# Patient Record
Sex: MALE | Race: OTHER | HISPANIC OR LATINO
[De-identification: names, ages, dates, MRNs, and addresses within clinical notes are randomized per-mention and may not be internally consistent; named-entity substitution may affect disease eponyms.]

---
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
## 2025-04-26 ENCOUNTER — EMERGENCY (EMERGENCY)
Facility: HOSPITAL | Age: 20
LOS: 0 days | Discharge: HOME | End: 2025-04-26
Attending: EMERGENCY MEDICINE

## 2025-04-26 VITALS
OXYGEN SATURATION: 98 % | HEART RATE: 79 BPM | WEIGHT: 158.73 LBS | DIASTOLIC BLOOD PRESSURE: 77 MMHG | RESPIRATION RATE: 17 BRPM | TEMPERATURE: 99 F | SYSTOLIC BLOOD PRESSURE: 126 MMHG

## 2025-04-26 PROCEDURE — 99282 EMERGENCY DEPT VISIT SF MDM: CPT

## 2025-04-26 PROCEDURE — 99283 EMERGENCY DEPT VISIT LOW MDM: CPT

## 2025-04-26 NOTE — ED PROVIDER NOTE - OBJECTIVE STATEMENT
20-year-old male with no significant past medical history up-to-date on vaccines presenting with complaint of shortness of breath.  Patient reports she woke up around 3:30 AM with difficulty breathing.  Patient states symptoms lasted about 2 minutes.  During which she was coughing.  Following the episode patient states she had 30 minutes where he felt like he had heavy breathing but symptoms have since resolved.  Denies any recent fevers, headache, dizziness, ear pain, sore throat, nausea, vomiting, chest pain, abdominal pain, diarrhea, constipation,  symptoms.  Patient states he took DayQuil yesterday but has no known allergies.  Denies any other complaints at this time. Patient reports she was anxious and thinking about things when the episode occurred.

## 2025-04-26 NOTE — ED PROVIDER NOTE - NSFOLLOWUPINSTRUCTIONS_ED_ALL_ED_FT
Follow-up with PMD within the next week.    Shortness of Breath, Pediatric  Shortness of breath means that your child is having trouble breathing. Having shortness of breath may mean that your child has a medical problem that needs treatment. Your child should get medical care right away for shortness of breath.    Follow these instructions at home:  Medicines    Give over-the-counter and prescription medicines only as told by your child's health care provider. This includes oxygen and any inhaled medicines.  If your child was prescribed an antibiotic medicine, have him or her take it as told by your child's health care provider. Do not stop giving your child the antibiotic even if your child starts to feel better.  Pollutants    A sign showing that a person should not smoke  Do not allow your child to use any products that contain nicotine or tobacco. These products include cigarettes, chewing tobacco, and vaping devices, such as e-cigarettes.  Do not smoke around your child. If you or your child needs help quitting, ask your health care provider.  Talk to your child about the risks of inhaling nicotine or vapor.  Have your child avoid exposure to smoke. This includes campfire smoke, forest fire smoke, and secondhand smoke from tobacco products. Do not allow others to smoke in your home or around your child.  Keep your child away from things that can irritate his or her airways and make it more difficult to breathe, such as:  Mold.  Dust.  Air pollution.  Chemical fumes.  Things that can give your child an allergic reaction (allergens) if your child has allergies. Common allergens include pollen from grasses or trees and animal dander.  Keep your child's living space clean and free of mold and dust.  General instructions    Pay attention to any changes in your child's symptoms.  Have your child rest as needed.  Have your child return to his or her normal activities as told by his or her health care provider. Ask your child's health care provider what activities are safe for your child. This includes exercise.  Keep all follow-up visits. This is important.  Contact a health care provider if:  Your child does not get better.  Your child is less active than usual because of shortness of breath.  Your child has new symptoms.  Your child cannot walk up stairs or exercise normally  Get help right away if:  Your child's symptoms get worse.  Your child has shortness of breath while resting.  Your child feels light-headed or faint.  Your child develops a cough that is not controlled with medicines.  Your child coughs up blood.  Your child has pain with breathing.  Your child has a fever.  These symptoms may be an emergency. Do not wait to see if the symptoms will go away. Get help right away. Call 911.    Summary  Shortness of breath means that your child is having trouble breathing.  Having shortness of breath may mean that your child has a medical problem that needs treatment.  Your child should get medical care right away for shortness of breath.  This information is not intended to replace advice given to you by your health care provider. Make sure you discuss any questions you have with your health care provider.

## 2025-04-26 NOTE — ED PROVIDER NOTE - PHYSICAL EXAMINATION
Constitutional: Well developed, well nourished, no acute distress  Head: Normocephalic, Atraumatic  Eyes: PERRLA, EOMI, conjunctiva and sclera WNL  ENT: Moist mucous membranes, no rhinorrhea,   Neck: Supple, Nontender,    Respiratory: Normal chest excursion with respiration; Breath sounds clear and equal B/L; No wheezes, rales, or rhonchi   Cardiovascular: RRR; Normal S1, S2; No murmurs, rubs or gallops   ABD/GI:  Nondistended; Nontender; No guarding, rigidity or rebound;    EXT/MS: Moving all extremities; Distal pulses 2+ B/L;   Skin: Normal for age and race; Warm and dry; No rash  Neurologic: AAO x 4;   Normal motor and sensory function  Psychiatric: Appropriate affect, normal mood

## 2025-04-26 NOTE — ED PROVIDER NOTE - PATIENT PORTAL LINK FT
You can access the FollowMyHealth Patient Portal offered by [facility] by registering at the following website: [email or web address]. By joining [facility]’s FollowMyHealth portal, you will also be able to view your health information using other applications (apps) compatible with our system.

## 2025-04-26 NOTE — ED PEDIATRIC TRIAGE NOTE - CHIEF COMPLAINT QUOTE
PT c/o being woken by the sensation to cough and it felt stuck in his chest and could not get it out. PT denies any sob now

## 2025-04-26 NOTE — ED PROVIDER NOTE - CLINICAL SUMMARY MEDICAL DECISION MAKING FREE TEXT BOX
20-year-old male with no significant past medical history, vaccines up-to-date, presenting with shortness of breath after he woke up at 3:30 AM.  Patient stated the episode lasted 2 minutes.  At that time he was coughing.  Patient said he felt some heavy breathing for about 30 minutes after that which then resolved.  No chest pain, nausea, vomiting, dizziness, sore throat, fever, abdominal pain, diarrhea.  Patient had DayQuil yesterday.  No known allergies.  Patient said he was anxious and thinking about stressful things when the episode happened.  Exam - Gen - NAD, Head - NCAT, Pharynx - clear, MMM, Heart - RRR, no m/g/r, Lungs - CTAB, no w/c/r, Abdomen - soft, NT, ND, no tachypnea retractions, skin - No rash, Extremities - FROM, no edema, erythema, ecchymosis, Neuro - CN 2-12 intact, nl strength and sensation, nl gait.  Diagnosis–shortness of breath, anxiety.  Discussed potential for panic attack.  Patient appears comfortable at this time.  Advised follow-up with PMD and given return precautions.

## 2025-05-30 ENCOUNTER — OUTPATIENT (OUTPATIENT)
Dept: OUTPATIENT SERVICES | Facility: HOSPITAL | Age: 20
LOS: 1 days | End: 2025-05-30
Payer: MEDICAID

## 2025-05-30 ENCOUNTER — APPOINTMENT (OUTPATIENT)
Dept: PEDIATRIC ADOLESCENT MEDICINE | Facility: CLINIC | Age: 20
End: 2025-05-30

## 2025-05-30 VITALS
SYSTOLIC BLOOD PRESSURE: 116 MMHG | TEMPERATURE: 98.4 F | WEIGHT: 162 LBS | HEART RATE: 88 BPM | DIASTOLIC BLOOD PRESSURE: 70 MMHG | HEIGHT: 65 IN | BODY MASS INDEX: 26.99 KG/M2 | RESPIRATION RATE: 24 BRPM

## 2025-05-30 DIAGNOSIS — Z13.31 ENCOUNTER FOR SCREENING FOR DEPRESSION: ICD-10-CM

## 2025-05-30 DIAGNOSIS — Z71.89 OTHER SPECIFIED COUNSELING: ICD-10-CM

## 2025-05-30 DIAGNOSIS — Z00.00 ENCOUNTER FOR GENERAL ADULT MEDICAL EXAMINATION WITHOUT ABNORMAL FINDINGS: ICD-10-CM

## 2025-05-30 DIAGNOSIS — Z65.8 OTHER SPECIFIED PROBLEMS RELATED TO PSYCHOSOCIAL CIRCUMSTANCES: ICD-10-CM

## 2025-05-30 PROCEDURE — G0444 DEPRESSION SCREEN ANNUAL: CPT

## 2025-05-30 PROCEDURE — 99395 PREV VISIT EST AGE 18-39: CPT | Mod: 25

## 2025-05-30 PROCEDURE — 99395 PREV VISIT EST AGE 18-39: CPT

## 2025-05-30 PROCEDURE — 80061 LIPID PANEL: CPT

## 2025-05-30 PROCEDURE — 99173 VISUAL ACUITY SCREEN: CPT | Mod: 59

## 2025-05-30 PROCEDURE — 83036 HEMOGLOBIN GLYCOSYLATED A1C: CPT

## 2025-05-30 PROCEDURE — 36415 COLL VENOUS BLD VENIPUNCTURE: CPT

## 2025-05-30 PROCEDURE — 99173 VISUAL ACUITY SCREEN: CPT

## 2025-06-02 ENCOUNTER — NON-APPOINTMENT (OUTPATIENT)
Age: 20
End: 2025-06-02

## 2025-06-02 DIAGNOSIS — Z00.00 ENCOUNTER FOR GENERAL ADULT MEDICAL EXAMINATION W/OUT ABNORMAL FINDINGS: ICD-10-CM

## 2025-06-02 LAB
CHOLEST SERPL-MCNC: 206 MG/DL
ESTIMATED AVERAGE GLUCOSE: 117 MG/DL
HBA1C MFR BLD HPLC: 5.7 %
HDLC SERPL-MCNC: 42 MG/DL
LDLC SERPL-MCNC: 107 MG/DL
NONHDLC SERPL-MCNC: 164 MG/DL
TRIGL SERPL-MCNC: 332 MG/DL

## 2025-08-27 ENCOUNTER — NON-APPOINTMENT (OUTPATIENT)
Age: 20
End: 2025-08-27